# Patient Record
Sex: FEMALE
[De-identification: names, ages, dates, MRNs, and addresses within clinical notes are randomized per-mention and may not be internally consistent; named-entity substitution may affect disease eponyms.]

---

## 2018-07-10 ENCOUNTER — HOSPITAL ENCOUNTER (EMERGENCY)
Dept: HOSPITAL 68 - ERH | Age: 48
End: 2018-07-10
Payer: COMMERCIAL

## 2018-07-10 VITALS — SYSTOLIC BLOOD PRESSURE: 134 MMHG | DIASTOLIC BLOOD PRESSURE: 80 MMHG

## 2018-07-10 VITALS — HEIGHT: 63 IN | BODY MASS INDEX: 32.78 KG/M2 | WEIGHT: 185 LBS

## 2018-07-10 DIAGNOSIS — R20.2: Primary | ICD-10-CM

## 2018-07-10 DIAGNOSIS — M25.511: ICD-10-CM

## 2018-07-10 NOTE — ED GENERAL ADULT
History of Present Illness
 
General
Chief Complaint: Upper Extremity Problem
Stated Complaint: R ARM PAIN
Source: patient
Exam Limitations: no limitations
 
Vital Signs & Intake/Output
Vital Signs & Intake/Output
 ED Intake and Output
 
 
 07/11 0000 07/10 1200
 
Intake Total  
 
Output Total  
 
Balance  
 
   
 
Patient 185 lb 
 
Weight  
 
Weight Estimated 
 
Measurement  
 
Method  
 
 
 
Allergies
Coded Allergies:
NO KNOWN ALLERGIES (03/11/12)
 
Reconcile Medications
Acetaminophen/Butalbital/Caf (Fioricet 325 MG-50 MG-40 MG) 1 TAB TAB   1 TAB PO 
Q4P PRN headache
     do not exceed 6 tablet(s) in 24 hours
Ondansetron (Zofran Odt) 4 MG TAB.RAPDIS   1 ODT SL Q6P PRN NAUSEA
 
Triage Note:
PT TO ED C/O RIGHT ARM WEAKNESS AND PAIN X 1 WEEK.
DENIES OBVIOUS INJURY. HAS NOT TRIED OTC MEDS,
DECLINING MEDS IN TRIAGE.
Triage Nurses Notes Reviewed? yes
Onset: Gradual
Duration: day(s):
Timing: recent history
Injury Environment: home
Severity: moderate
HPI:
48-year-old female with history of migraines presents emergency department 
complaining of right arm pain, weakness, numbness in the past few days.  Patient
states that last night she had numbness to the right side of her face and when 
she woke up and looked in the mirror her face appeared "puffy".  Patient states 
that previous symptoms have resolved however she still has pain and weakness to 
the right arm.  Patient states that sometimes she will drop objects in the right
arm due to this weakness.  There is no injury or trauma prior to onset of her 
symptoms.  Patient denies headache, neck pain, chest pain.
(Marcia Gutierrez)
 
Past History
 
Travel History
Traveled to [place] past 21 day No
 
Medical History
Any Pertinent Medical History? see below for history
Neurological: migraine
History of CDIFF: No
 
Surgical History
Surgical History: non-contributory
 
Psychosocial History
What is your primary language English
Tobacco Use: Never used
ETOH Use: denies use
Illicit Drug Use: denies illicit drug use
 
Family History
Hx Contributory? No
(Marcia Gutierrez)
 
Review of Systems
 
Review of Systems
Constitutional:
Reports: no symptoms. 
EENTM:
Reports: no symptoms. 
Respiratory:
Reports: no symptoms. 
Cardiovascular:
Reports: no symptoms. 
GI:
Reports: no symptoms. 
Genitourinary:
Reports: no symptoms. 
Musculoskeletal:
Reports: see HPI. 
Skin:
Reports: no symptoms. 
Neurological/Psychological:
Reports: see HPI. 
Hematologic/Endocrine:
Reports: no symptoms. 
Immunologic/Allergic:
Reports: no symptoms. 
All Other Systems: Reviewed and Negative
(Marcia Gutierrez)
 
Physical Exam
 
Physical Exam
General Appearance: well developed/nourished, no apparent distress, alert, awake
Head: atraumatic, normal appearance
Eyes:
Bilateral: normal appearance, PERRL, EOMI. 
Ears, Nose, Throat: normal pharynx, hearing grossly normal
Neck: normal inspection, supple, full range of motion
Respiratory: normal breath sounds, no respiratory distress, lungs clear
Cardiovascular: regular rate/rhythm
Back: normal inspection, normal range of motion
Extremities: normal inspection, normal range of motion, TENDERNESS TO ANTERIOR 
RIGHT SHOULDER WITHOUT DEFORMITY OR SKIN CHANGES
Neurologic/Psych: no motor/sensory deficits, awake, alert, oriented x 3, normal 
mood/affect, CNs II-XII nml as tested, STRENGTH 5/5 EQUAL BILATERAL UPPER 
EXTREMITIES
Skin: intact, normal color, warm/dry
 
Core Measures
ACS in differential dx? No
CVA/TIA Diagnosis: No
Sepsis Present: No
Sepsis Focused Exam Completed? No
(Marcia Gutierrez)
 
Progress
Differential Diagnoses
I considered the following diagnoses in my evaluation of the patient: [ICH, or 
cranial mass/lesion, cervical radiculopathy, degenerative disc disease, CVA/TIA,
fracture, muscle strain]
 
Plan of Care:
 Laboratory Tests
 
 
07/10/18 1458:
Urine Pregnancy Test Cancelled
 
On physical exam the patient is neurologically intact without focal neurologic 
deficit.  The patient has reproducible pain to right shoulder.  Patient states 
she is worried about a brain tumor as her mother suffered from this when she was
the patient's age. Will obtain head CT scan to futher evaluate the patient's 
paresthesias. I also offered the patient shoulder xrays which she agrees to. Dr. Griggs agrees with this plan.
 
While waiting for radiology to bring the patient for xray/CT scan the patient 
eloped from the ED. No images were taken.
Initial ED EKG: none
(Marcia Gutierrez)
 
Departure
 
Departure
Disposition: ER WALKOUT
Condition: Stable
Clinical Impression
Primary Impression: Paresthesias
Secondary Impressions: 
Right shoulder pain
Qualifiers:  Chronicity: acute Qualified Code: M25.511 - Pain in right shoulder
 
Referrals:
Giovanna Mcdaniel (PCP/Family)
 
Departure Forms:
Customer Survey
General Discharge Information
(Marcia Gutierrez)
 
PA/NP Co-Sign Statement
Statement:
ED Attending supervision documentation-
 
 I saw and evaluated the patient. I have also reviewed all the pertinent lab 
results and diagnostic results. I agree with the findings and the plan of care 
as documented in the PA's/NP's documentation. 
 
x I have reviewed the ED Record and agree with the PA's/NP's documentation.
 
[] Additions or exceptions (if any) to the PAs/NP's note and plan are 
summarized below:
[]
 
(Anson GRANDE,Pb)
 
Critical Care Note
 
Critical Care Note
Critical Care Time: non-applicable
(Marcia Gutierrez)